# Patient Record
Sex: FEMALE | Race: ASIAN
[De-identification: names, ages, dates, MRNs, and addresses within clinical notes are randomized per-mention and may not be internally consistent; named-entity substitution may affect disease eponyms.]

---

## 2020-08-25 ENCOUNTER — HOSPITAL ENCOUNTER (INPATIENT)
Dept: HOSPITAL 4 - SED | Age: 75
LOS: 5 days | Discharge: HOME | DRG: 871 | End: 2020-08-30
Attending: INTERNAL MEDICINE | Admitting: INTERNAL MEDICINE
Payer: SELF-PAY

## 2020-08-25 VITALS — SYSTOLIC BLOOD PRESSURE: 117 MMHG

## 2020-08-25 VITALS — HEIGHT: 63 IN | BODY MASS INDEX: 22.34 KG/M2 | WEIGHT: 126.06 LBS

## 2020-08-25 VITALS — SYSTOLIC BLOOD PRESSURE: 131 MMHG

## 2020-08-25 VITALS — SYSTOLIC BLOOD PRESSURE: 134 MMHG

## 2020-08-25 VITALS — SYSTOLIC BLOOD PRESSURE: 104 MMHG

## 2020-08-25 VITALS — SYSTOLIC BLOOD PRESSURE: 158 MMHG

## 2020-08-25 VITALS — SYSTOLIC BLOOD PRESSURE: 114 MMHG

## 2020-08-25 DIAGNOSIS — E83.41: ICD-10-CM

## 2020-08-25 DIAGNOSIS — Z79.01: ICD-10-CM

## 2020-08-25 DIAGNOSIS — E87.6: ICD-10-CM

## 2020-08-25 DIAGNOSIS — I50.41: ICD-10-CM

## 2020-08-25 DIAGNOSIS — I34.0: ICD-10-CM

## 2020-08-25 DIAGNOSIS — E87.1: ICD-10-CM

## 2020-08-25 DIAGNOSIS — E87.5: ICD-10-CM

## 2020-08-25 DIAGNOSIS — A41.9: Primary | ICD-10-CM

## 2020-08-25 DIAGNOSIS — E83.51: ICD-10-CM

## 2020-08-25 DIAGNOSIS — Z20.828: ICD-10-CM

## 2020-08-25 DIAGNOSIS — I11.0: ICD-10-CM

## 2020-08-25 DIAGNOSIS — J96.01: ICD-10-CM

## 2020-08-25 DIAGNOSIS — I35.1: ICD-10-CM

## 2020-08-25 DIAGNOSIS — J18.9: ICD-10-CM

## 2020-08-25 DIAGNOSIS — E87.2: ICD-10-CM

## 2020-08-25 LAB
ALBUMIN SERPL BCP-MCNC: 3.5 G/DL (ref 3.4–4.8)
ALT SERPL W P-5'-P-CCNC: 44 U/L (ref 12–78)
ANION GAP SERPL CALCULATED.3IONS-SCNC: 13 MMOL/L (ref 5–15)
APPEARANCE UR: CLEAR
AST SERPL W P-5'-P-CCNC: 27 U/L (ref 10–37)
BACTERIA URNS QL MICRO: (no result) /HPF
BASOPHILS # BLD AUTO: 0 K/UL (ref 0–0.2)
BASOPHILS NFR BLD AUTO: 0.3 % (ref 0–2)
BILIRUB SERPL-MCNC: 0.9 MG/DL (ref 0–1)
BILIRUB UR QL STRIP: NEGATIVE
BUN SERPL-MCNC: 13 MG/DL (ref 8–21)
CALCIUM SERPL-MCNC: 8.7 MG/DL (ref 8.4–11)
CHLORIDE SERPL-SCNC: 93 MMOL/L (ref 98–107)
COLOR UR: YELLOW
CREAT SERPL-MCNC: 1.39 MG/DL (ref 0.55–1.3)
CRP SERPL-MCNC: 0.3 MG/DL (ref 0–0.5)
EOSINOPHIL # BLD AUTO: 0.1 K/UL (ref 0–0.4)
EOSINOPHIL NFR BLD AUTO: 0.7 % (ref 0–4)
ERYTHROCYTE [DISTWIDTH] IN BLOOD BY AUTOMATED COUNT: 12.9 % (ref 9–15)
FIBRINOGEN PPP-MCNC: 333 MG/DL (ref 200–400)
GFR SERPL CREATININE-BSD FRML MDRD: (no result) ML/MIN (ref 90–?)
GLUCOSE SERPL-MCNC: 298 MG/DL (ref 70–99)
GLUCOSE UR STRIP-MCNC: (no result) MG/DL
HCT VFR BLD AUTO: 43.3 % (ref 36–48)
HGB BLD-MCNC: 14.5 G/DL (ref 12–16)
HGB UR QL STRIP: NEGATIVE
INR PPP: 1 (ref 0.8–1.2)
KETONES UR STRIP-MCNC: NEGATIVE MG/DL
LACTATE SERPL-SCNC: 246 U/L (ref 81–234)
LEUKOCYTE ESTERASE UR QL STRIP: NEGATIVE
LYMPHOCYTES # BLD AUTO: 3.9 K/UL (ref 1–5.5)
LYMPHOCYTES NFR BLD AUTO: 37.3 % (ref 20.5–51.5)
MCH RBC QN AUTO: 31 PG (ref 27–31)
MCHC RBC AUTO-ENTMCNC: 34 % (ref 32–36)
MCV RBC AUTO: 92 FL (ref 79–98)
MONOCYTES # BLD MANUAL: 0.6 K/UL (ref 0–1)
MONOCYTES # BLD MANUAL: 5.5 % (ref 1.7–9.3)
NEUTROPHILS # BLD AUTO: 5.8 K/UL (ref 1.8–7.7)
NEUTROPHILS NFR BLD AUTO: 56.2 % (ref 40–70)
NITRITE UR QL STRIP: NEGATIVE
PH UR STRIP: 6 [PH] (ref 5–8)
PLATELET # BLD AUTO: 268 K/UL (ref 130–430)
POTASSIUM SERPL-SCNC: 3.4 MMOL/L (ref 3.5–5.1)
PROT UR QL STRIP: (no result)
PROTHROMBIN TIME: 10.4 SECS (ref 9.5–12.5)
RBC # BLD AUTO: 4.69 MIL/UL (ref 4.2–6.2)
RBC #/AREA URNS HPF: (no result) /HPF (ref 0–3)
SODIUM SERPLBLD-SCNC: 126 MMOL/L (ref 136–145)
SP GR UR STRIP: 1.02 (ref 1–1.03)
UROBILINOGEN UR STRIP-MCNC: 0.2 MG/DL (ref 0.2–1)
WBC # BLD AUTO: 10.4 K/UL (ref 4.8–10.8)
WBC #/AREA URNS HPF: (no result) /HPF (ref 0–3)

## 2020-08-25 PROCEDURE — G0378 HOSPITAL OBSERVATION PER HR: HCPCS

## 2020-08-25 RX ADMIN — IPRATROPIUM BROMIDE AND ALBUTEROL SULFATE SCH ML: .5; 3 SOLUTION RESPIRATORY (INHALATION) at 20:10

## 2020-08-25 RX ADMIN — IPRATROPIUM BROMIDE AND ALBUTEROL SULFATE SCH ML: .5; 3 SOLUTION RESPIRATORY (INHALATION) at 13:31

## 2020-08-25 RX ADMIN — CARVEDILOL SCH MG: 6.25 TABLET, FILM COATED ORAL at 20:15

## 2020-08-25 RX ADMIN — FUROSEMIDE SCH MG: 10 INJECTION, SOLUTION INTRAMUSCULAR; INTRAVENOUS at 08:15

## 2020-08-25 RX ADMIN — IPRATROPIUM BROMIDE AND ALBUTEROL SULFATE SCH ML: .5; 3 SOLUTION RESPIRATORY (INHALATION) at 15:10

## 2020-08-25 RX ADMIN — ENOXAPARIN SODIUM SCH MG: 30 INJECTION SUBCUTANEOUS at 13:00

## 2020-08-25 RX ADMIN — IPRATROPIUM BROMIDE AND ALBUTEROL SULFATE SCH ML: .5; 3 SOLUTION RESPIRATORY (INHALATION) at 23:15

## 2020-08-25 RX ADMIN — FUROSEMIDE SCH MG: 10 INJECTION, SOLUTION INTRAMUSCULAR; INTRAVENOUS at 12:00

## 2020-08-25 RX ADMIN — IPRATROPIUM BROMIDE AND ALBUTEROL SULFATE SCH ML: .5; 3 SOLUTION RESPIRATORY (INHALATION) at 08:15

## 2020-08-25 NOTE — NUR
1500mls of Normal Saline given as bolus per MD order. 1L infusing at 100mls/hr 
per md order. sepsis protocol initiated per MD order.

## 2020-08-25 NOTE — NUR
ADMISSION NOTE

Received patient from ER via elissa, received report from GREGOR mcguire. Patient admitted with 
diagnosis of PNA, PUI. Patient oriented to hospital routine, call light, toileting and 
safety-patient verbalized understanding.

## 2020-08-25 NOTE — NUR
initial notes

rec patient awake alert and speaks Polish only. with silvano mask at 9 liters . no sob 
noted and sat is 100%. denies pain.call light withn reached. seen by dr segundo. awaiting for 
dr lee to call for diet order.

## 2020-08-25 NOTE — NUR
closing notes

denies pain, resting comfortably needs met. no sob noted. call light within reached. bed in 
low positon and side rails up and locked.

## 2020-08-25 NOTE — NUR
pt BIB ALS from home c/o of shortness of breath that started today. pt O2 was 
91% when FIRE arrived, was put on a nonrebreather on 15L. pt arrived and o2 
saturation 100% on 15L o2. pt appears anxious, diaphoretic, cool to the touch, 
and is working to breathe. pt speaks Turkish.

## 2020-08-25 NOTE — NUR
Patient will be admitted to care of DR. WATKINS.  Admitted to TELE unit.  Will 
go to room 120.  Belongings list completed.  Complete and up to date summary 
report printed. SBAR report to be given at bedside with opportunity for 
questions.

## 2020-08-25 NOTE — NUR
Placed in room 8  . Placed on cardiac monitor, blood pressure machine and pulse 
oximeter. To gown for exam. Side rails up.

Report given to Delma BUNDY.

## 2020-08-25 NOTE — NUR
Pt is resting quietly in bed. No c/o pain or discomfort. Fall, Droplet Isolation and Safety 
precautions are in place.

## 2020-08-25 NOTE — NUR
rounds

son came and brought cell phone for patient. son constantly calls and wants an update for 
the patient. resting comfortably.

## 2020-08-25 NOTE — NUR
# 20 gauge angiocath placed to Lt hand. Use of asceptic technique.  Opsite 
placed over site.  Blood return noted.  Blood for lab drawn from site.  Flushed 
with 10 cc of normal saline.  No evidence of infiltration noted.  Patient 
tolerated well.

## 2020-08-25 NOTE — NUR
RN returned telephone call to pt's son Giuliano. Updates were given to him per his request 
and all his questions were answered.

-------------------------------------------------------------------------------

Addendum: 08/25/20 at 2129 by Basia Mcdowell RN

-------------------------------------------------------------------------------

Please disregard for wrong entry time.

## 2020-08-25 NOTE — NUR
Pt was received lying in bed fully awake, alert and oriented x4. Pt is on oxygen at 2L/min 
via NC and oxygen saturation is 98%. Pt is Jordanian speaking, but able to understand and 
speak little English. Pt is able to make her needs known in English. Saline lock in right 
hand is without any signs of infiltration. Fall, Droplet Isolation and Safety precautions 
are in place. Call light is with pt and bed alarm is on.

## 2020-08-25 NOTE — NUR
Scheduled HS medication given and no difficulty swallowing noted. Pt remains on oxygen at 
2L/min per NC. Call light is with pt and bed alarm is on.

## 2020-08-25 NOTE — NUR
CLOSING NOTE

PATIENT AWAKE IN BED, ASKING TO GO HOME. EDUCATED PATIENT ON REASON SHES IN THE HOSPITAL. 
RESPIRATIONS EVEN AND UNLABORED ON OXYGEN 10LPM VIA VENTURI MASK. TOLERATING IVF AS ORDERED, 
IV INTACT AND PATENT WITH NO SIGNS OF INFILTRATION NOTED. ISOLATION PRECAUTIONS REMAIN IN 
PLACE PER COVID PUI. BED LOCKED IN LOWEST POSITION, WITH CALL LIGHT IN REACH. WILL CONTINUE 
TO MONITOR UNTIL ENDORSED TO AM NURSE. PATIENT STABLE. ALL PATIENT CARE NEEDS MET THROUGHOUT 
SHIFT.

## 2020-08-25 NOTE — NUR
RN returned telephone call to pt's son Giuliano. Updates were given to him per his request 
and all his questions were answered.

## 2020-08-26 VITALS — SYSTOLIC BLOOD PRESSURE: 112 MMHG

## 2020-08-26 VITALS — SYSTOLIC BLOOD PRESSURE: 122 MMHG

## 2020-08-26 VITALS — SYSTOLIC BLOOD PRESSURE: 130 MMHG

## 2020-08-26 VITALS — SYSTOLIC BLOOD PRESSURE: 143 MMHG

## 2020-08-26 VITALS — SYSTOLIC BLOOD PRESSURE: 138 MMHG

## 2020-08-26 LAB
ALBUMIN SERPL BCP-MCNC: 3.4 G/DL (ref 3.4–4.8)
ALT SERPL W P-5'-P-CCNC: 49 U/L (ref 12–78)
ANION GAP SERPL CALCULATED.3IONS-SCNC: 12 MMOL/L (ref 5–15)
AST SERPL W P-5'-P-CCNC: 37 U/L (ref 10–37)
BASOPHILS # BLD AUTO: 0 K/UL (ref 0–0.2)
BASOPHILS NFR BLD AUTO: 0.2 % (ref 0–2)
BILIRUB SERPL-MCNC: 0.9 MG/DL (ref 0–1)
BUN SERPL-MCNC: 14 MG/DL (ref 8–21)
CALCIUM SERPL-MCNC: 8.2 MG/DL (ref 8.4–11)
CHLORIDE SERPL-SCNC: 91 MMOL/L (ref 98–107)
CHOLEST SERPL-MCNC: 223 MG/DL (ref ?–200)
CREAT SERPL-MCNC: 1.16 MG/DL (ref 0.55–1.3)
EOSINOPHIL # BLD AUTO: 0 K/UL (ref 0–0.4)
EOSINOPHIL NFR BLD AUTO: 0 % (ref 0–4)
ERYTHROCYTE [DISTWIDTH] IN BLOOD BY AUTOMATED COUNT: 13 % (ref 9–15)
GFR SERPL CREATININE-BSD FRML MDRD: (no result) ML/MIN (ref 90–?)
GLUCOSE SERPL-MCNC: 139 MG/DL (ref 70–99)
HCT VFR BLD AUTO: 39.6 % (ref 36–48)
HDLC SERPL-MCNC: 65 MG/DL (ref 55–?)
HGB BLD-MCNC: 13.4 G/DL (ref 12–16)
LDL CHOLESTEROL: 138 MG/DL (ref ?–100)
LYMPHOCYTES # BLD AUTO: 1.6 K/UL (ref 1–5.5)
LYMPHOCYTES NFR BLD AUTO: 17.9 % (ref 20.5–51.5)
MCH RBC QN AUTO: 31 PG (ref 27–31)
MCHC RBC AUTO-ENTMCNC: 34 % (ref 32–36)
MCV RBC AUTO: 92 FL (ref 79–98)
MONOCYTES # BLD MANUAL: 0.5 K/UL (ref 0–1)
MONOCYTES # BLD MANUAL: 5 % (ref 1.7–9.3)
NEUTROPHILS # BLD AUTO: 7 K/UL (ref 1.8–7.7)
NEUTROPHILS NFR BLD AUTO: 76.9 % (ref 40–70)
PLATELET # BLD AUTO: 248 K/UL (ref 130–430)
POTASSIUM SERPL-SCNC: 3.5 MMOL/L (ref 3.5–5.1)
RBC # BLD AUTO: 4.31 MIL/UL (ref 4.2–6.2)
SODIUM SERPLBLD-SCNC: 125 MMOL/L (ref 136–145)
TRIGL SERPL-MCNC: 107 MG/DL (ref 30–150)
TSH SERPL DL<=0.05 MIU/L-ACNC: 1.3 UIU/ML (ref 0.36–3.74)
WBC # BLD AUTO: 9.1 K/UL (ref 4.8–10.8)

## 2020-08-26 RX ADMIN — IPRATROPIUM BROMIDE AND ALBUTEROL SULFATE SCH ML: .5; 3 SOLUTION RESPIRATORY (INHALATION) at 03:00

## 2020-08-26 RX ADMIN — IPRATROPIUM BROMIDE AND ALBUTEROL SULFATE SCH ML: .5; 3 SOLUTION RESPIRATORY (INHALATION) at 15:36

## 2020-08-26 RX ADMIN — IPRATROPIUM BROMIDE AND ALBUTEROL SULFATE SCH ML: .5; 3 SOLUTION RESPIRATORY (INHALATION) at 20:09

## 2020-08-26 RX ADMIN — IPRATROPIUM BROMIDE AND ALBUTEROL SULFATE SCH ML: .5; 3 SOLUTION RESPIRATORY (INHALATION) at 08:25

## 2020-08-26 RX ADMIN — FUROSEMIDE SCH MG: 10 INJECTION, SOLUTION INTRAMUSCULAR; INTRAVENOUS at 17:00

## 2020-08-26 RX ADMIN — CEFTRIAXONE SODIUM SCH MLS/HR: 1 INJECTION, SOLUTION INTRAVENOUS at 09:33

## 2020-08-26 RX ADMIN — IPRATROPIUM BROMIDE AND ALBUTEROL SULFATE SCH ML: .5; 3 SOLUTION RESPIRATORY (INHALATION) at 11:28

## 2020-08-26 RX ADMIN — Medication SCH MG: at 09:33

## 2020-08-26 RX ADMIN — FUROSEMIDE SCH MG: 10 INJECTION, SOLUTION INTRAMUSCULAR; INTRAVENOUS at 11:30

## 2020-08-26 RX ADMIN — ENOXAPARIN SODIUM SCH MG: 30 INJECTION SUBCUTANEOUS at 09:36

## 2020-08-26 RX ADMIN — SODIUM CHLORIDE SCH MLS/HR: 0.9 INJECTION, SOLUTION INTRAVENOUS at 09:33

## 2020-08-26 RX ADMIN — IPRATROPIUM BROMIDE AND ALBUTEROL SULFATE SCH ML: .5; 3 SOLUTION RESPIRATORY (INHALATION) at 23:28

## 2020-08-26 RX ADMIN — CARVEDILOL SCH MG: 6.25 TABLET, FILM COATED ORAL at 21:00

## 2020-08-26 RX ADMIN — CARVEDILOL SCH MG: 6.25 TABLET, FILM COATED ORAL at 09:34

## 2020-08-26 RX ADMIN — FUROSEMIDE SCH MG: 10 INJECTION, SOLUTION INTRAMUSCULAR; INTRAVENOUS at 09:35

## 2020-08-26 NOTE — NUR
MED ADMIN:

MORNING MEDS GIVEN, AS PER ORDERED BY M.D. TOLERATED WELL, WILL CONT' WITH PLAN OF CARE.

## 2020-08-26 NOTE — NUR
TRANSFER:

PT TRANSFERRED TO [facility] ROOM 113-B, DUE TO COVID (-) X2, ISOLATION DISCONTINUED, WILL RESUME 
SAME ORDERS, WILL CONT' POC.

## 2020-08-26 NOTE — NUR
Initial notes:



Received report from lashawn RN. Patient is in bed, resting. No acute distress. Even, 
nonlabored breathing on Venturi mask 6L. IV site is patent and intact, saline locked. Bed is 
locked at lowest position. Side rails up x2. Call light is with patient. Safety and fall 
precautions in place. Will continue plan of care.

## 2020-08-26 NOTE — NUR
NURSES NOTES:

PT A/A/OX3, CONDITION IS STABLE, NO DISTRESS NOTED AT THIS TIME, NO C/O PAIN OR DISCOMFORT, 
WILL CONT' TO MONITOR AND ASSESS.

## 2020-08-26 NOTE — NUR
NURSES NOTES:

PT REMAINS STABLE WITH 6L O2 VIA VENT MASK, IS RESTING WHILE IN AND OUT OF SLEEP, CALL LIGHT 
PLACED WITHIN REACH, WILL CONT' TO MONITOR AND ASSESS.

## 2020-08-26 NOTE — NUR
Pt c/o not receiving any air via NC. Pt requested oxygen via mask. O2 sat checked on oxygen 
at 2L/min via NC and it was 100%. Pt still not convinced that she is getting oxygen. Pt 
insisted on putting her back on oxygen via face mask. Pt appears anxious and 
hyperventilating. RT was notified.

## 2020-08-26 NOTE — NUR
Pt remains very anxious and continues to scream for oxygen via face mask. New orders 
received from Dr. Reinoso for Stat ABG and IV Ativan. RT was notified for Stat ABG.

## 2020-08-26 NOTE — NUR
Pt is sleeping without any respiratory distress noted. Fall, Droplet Isolation and safety 
precautions are in place.

## 2020-08-26 NOTE — NUR
ASSUMPTION OF CARE:

RECEIVED PT A/ANXIOUS/CONFUSED, DX:INADEQUATE VENTILATION, R/T PNU, PUI, AFEBRILE, VSS, PT 
HAS AUDIBLE WHEEZING IN BILATERALLY LUNGS, CURRENTLY ON 6L VIA NASAL CANNULA WITH BREATHING 
TX Q4 AND Q6 PRN, PT REORIENTED TO UNIT, CALL PLACED WITHIN REACH, WILL CONT' TO MONITOR AND 
ASSESS.

## 2020-08-26 NOTE — NUR
Pt is very anxious and keeps requesting oxygen via mask. Pt is currently on oxygen via NC at 
2L/min and oxygen saturation is 99%. Pt was informed her oxygen saturation is normal, but pt 
keeps on insisting on putting her back on mask. RN left a message for pt's son to call her 
back.

## 2020-08-26 NOTE — NUR
HIGH ALERT NOTE:

RN called Dr. Reinoso back at  [phone], identified within the medical roster to verify 
physician authenticity for Ativan order.

## 2020-08-26 NOTE — NUR
RT NOTES

Placed pt on 28% 6L venturi mask for comfort, due to despite good saturation on 2L NC pt 
complains of "not enough" flow. GREGOR Flor made aware.

## 2020-08-26 NOTE — NUR
RT at pt's bedside and pt's oxygen saturation 99% on oxygen at 2L/min per NC. Breathing 
treatment given to pt by RT. Pt still asking for oxygen via mask. Pt was informed she does 
not need oxygen via mask.

## 2020-08-26 NOTE — NUR
Spoke to family:



Spoke to sonGiuliano over facetime with patient at bedside. Updated him on patient status 
and patient plan of care.

## 2020-08-27 VITALS — SYSTOLIC BLOOD PRESSURE: 112 MMHG

## 2020-08-27 VITALS — SYSTOLIC BLOOD PRESSURE: 108 MMHG

## 2020-08-27 VITALS — SYSTOLIC BLOOD PRESSURE: 106 MMHG

## 2020-08-27 VITALS — SYSTOLIC BLOOD PRESSURE: 110 MMHG

## 2020-08-27 VITALS — SYSTOLIC BLOOD PRESSURE: 107 MMHG

## 2020-08-27 LAB
ALBUMIN SERPL BCP-MCNC: 3 G/DL (ref 3.4–4.8)
ALT SERPL W P-5'-P-CCNC: 54 U/L (ref 12–78)
ANION GAP SERPL CALCULATED.3IONS-SCNC: 10 MMOL/L (ref 5–15)
AST SERPL W P-5'-P-CCNC: 45 U/L (ref 10–37)
BASOPHILS # BLD AUTO: 0 K/UL (ref 0–0.2)
BASOPHILS NFR BLD AUTO: 0.2 % (ref 0–2)
BILIRUB SERPL-MCNC: 0.7 MG/DL (ref 0–1)
BUN SERPL-MCNC: 21 MG/DL (ref 8–21)
CALCIUM SERPL-MCNC: 8.2 MG/DL (ref 8.4–11)
CHLORIDE SERPL-SCNC: 94 MMOL/L (ref 98–107)
CREAT SERPL-MCNC: 1.2 MG/DL (ref 0.55–1.3)
EOSINOPHIL # BLD AUTO: 0.1 K/UL (ref 0–0.4)
EOSINOPHIL NFR BLD AUTO: 0.6 % (ref 0–4)
ERYTHROCYTE [DISTWIDTH] IN BLOOD BY AUTOMATED COUNT: 13.1 % (ref 9–15)
GFR SERPL CREATININE-BSD FRML MDRD: (no result) ML/MIN (ref 90–?)
GLUCOSE SERPL-MCNC: 118 MG/DL (ref 70–99)
HCT VFR BLD AUTO: 36.3 % (ref 36–48)
HGB BLD-MCNC: 12.6 G/DL (ref 12–16)
LYMPHOCYTES # BLD AUTO: 1.3 K/UL (ref 1–5.5)
LYMPHOCYTES NFR BLD AUTO: 14.6 % (ref 20.5–51.5)
MCH RBC QN AUTO: 31 PG (ref 27–31)
MCHC RBC AUTO-ENTMCNC: 35 % (ref 32–36)
MCV RBC AUTO: 90 FL (ref 79–98)
MONOCYTES # BLD MANUAL: 0.9 K/UL (ref 0–1)
MONOCYTES # BLD MANUAL: 9.7 % (ref 1.7–9.3)
NEUTROPHILS # BLD AUTO: 6.8 K/UL (ref 1.8–7.7)
NEUTROPHILS NFR BLD AUTO: 74.9 % (ref 40–70)
PLATELET # BLD AUTO: 243 K/UL (ref 130–430)
POTASSIUM SERPL-SCNC: 3.6 MMOL/L (ref 3.5–5.1)
RBC # BLD AUTO: 4.04 MIL/UL (ref 4.2–6.2)
SODIUM SERPLBLD-SCNC: 126 MMOL/L (ref 136–145)
WBC # BLD AUTO: 9.1 K/UL (ref 4.8–10.8)

## 2020-08-27 RX ADMIN — IPRATROPIUM BROMIDE AND ALBUTEROL SULFATE SCH ML: .5; 3 SOLUTION RESPIRATORY (INHALATION) at 22:50

## 2020-08-27 RX ADMIN — IPRATROPIUM BROMIDE AND ALBUTEROL SULFATE SCH ML: .5; 3 SOLUTION RESPIRATORY (INHALATION) at 19:00

## 2020-08-27 RX ADMIN — CARVEDILOL SCH MG: 6.25 TABLET, FILM COATED ORAL at 08:57

## 2020-08-27 RX ADMIN — CEFTRIAXONE SODIUM SCH MLS/HR: 1 INJECTION, SOLUTION INTRAVENOUS at 09:04

## 2020-08-27 RX ADMIN — FUROSEMIDE SCH MG: 10 INJECTION, SOLUTION INTRAMUSCULAR; INTRAVENOUS at 06:12

## 2020-08-27 RX ADMIN — IPRATROPIUM BROMIDE AND ALBUTEROL SULFATE SCH ML: .5; 3 SOLUTION RESPIRATORY (INHALATION) at 15:21

## 2020-08-27 RX ADMIN — FUROSEMIDE SCH MG: 40 TABLET ORAL at 21:25

## 2020-08-27 RX ADMIN — CARVEDILOL SCH MG: 6.25 TABLET, FILM COATED ORAL at 21:25

## 2020-08-27 RX ADMIN — ENOXAPARIN SODIUM SCH MG: 30 INJECTION SUBCUTANEOUS at 08:59

## 2020-08-27 RX ADMIN — SODIUM CHLORIDE SCH MLS/HR: 0.9 INJECTION, SOLUTION INTRAVENOUS at 09:04

## 2020-08-27 RX ADMIN — IPRATROPIUM BROMIDE AND ALBUTEROL SULFATE SCH ML: .5; 3 SOLUTION RESPIRATORY (INHALATION) at 07:15

## 2020-08-27 RX ADMIN — IPRATROPIUM BROMIDE AND ALBUTEROL SULFATE SCH ML: .5; 3 SOLUTION RESPIRATORY (INHALATION) at 03:38

## 2020-08-27 RX ADMIN — IPRATROPIUM BROMIDE AND ALBUTEROL SULFATE SCH ML: .5; 3 SOLUTION RESPIRATORY (INHALATION) at 11:35

## 2020-08-27 RX ADMIN — Medication SCH MG: at 08:57

## 2020-08-27 NOTE — NUR
Nutrition Update



Zak Scale 18 noted.

Pt admitted for PNA, PUI

Diet: cardiac

BMI: 22.1 kg/m2



RD to follow per nutrition care standards.

## 2020-08-27 NOTE — NUR
Rounds:



Patient is in bed, sleeping. No acute distress. Breathing is even, nonlabored on Venturi 
mask 6L. Call light is with patient. Safety and fall precautions in place. Will continue to 
monitor.

## 2020-08-27 NOTE — NUR
Rounds:



Patient is in bed, sleeping. No acute distress. Respirations are even, nonlabored on Venturi 
mask 6L. Call light is with patient. Safety and fall precautions in place. Will continue 
monitoring.

## 2020-08-27 NOTE — NUR
Opening Notes

Patient received in bed able to answer questions and follow commands. Patient in no signs of 
distress at this time. Patient on a venturi mask at 6L, with saturation at 97%. Patient has 
a peripheral IV. Patient does not complain of pain. Safety precautions enforced.

## 2020-08-27 NOTE — NUR
Closing note:



Patient is in bed, resting. No acute distress. Even, nonlabored breathing on 6L Venturi 
Mask. IV site is saline locked, patent and intact. All needs met. Bed is locked at lowest 
position. Side rails up x2. Call light is with patient. Safety and fall precautions in 
place. Will endorse care to dayshift RN.

## 2020-08-27 NOTE — NUR
RN Rounds

Patient resting at this time, no signs of distress noted. Patient has no complaints of pain.

## 2020-08-27 NOTE — NUR
Rounds:



Patient is in bed, eating. No s/s acute distress. Even, nonlabored respirations on Venturi 
mask 6L. Call light is with patient. Safety and fall precautions in place. Will continue to 
monitor.

## 2020-08-27 NOTE — NUR
Spoke to Dr. Srinivasan:



Spoke to Dr. Srinivasan over the phone regarding patients /71 and HR 91. MD stated to 
continue scheduled medications Coreg 12.5mg PO and Lasix 40mg PO. Will continue to monitor 
and reassess patient.

## 2020-08-27 NOTE — NUR
Spoke to family member:



Spoke to son, Giuliano, twice. Son notified me of patient's concern of a cough. Educated son 
about medications given. Son translated information to patient. Patient verbalized 
understanding.

## 2020-08-27 NOTE — NUR
Initial notes:



Received report from dayshift RN. Patient is in bed, resting. No acute distress. Even, 
nonlabored breathing on 2L nasal cannula. IV site is patent and intact, saline locked. Bed 
is locked at lowest position. Side rails up x2. Call light is with patient. Safety and fall 
precautions in place. Will continue plan of care.

## 2020-08-28 VITALS — SYSTOLIC BLOOD PRESSURE: 99 MMHG

## 2020-08-28 VITALS — SYSTOLIC BLOOD PRESSURE: 100 MMHG

## 2020-08-28 VITALS — SYSTOLIC BLOOD PRESSURE: 97 MMHG

## 2020-08-28 VITALS — SYSTOLIC BLOOD PRESSURE: 105 MMHG

## 2020-08-28 VITALS — SYSTOLIC BLOOD PRESSURE: 122 MMHG

## 2020-08-28 LAB
ANION GAP SERPL CALCULATED.3IONS-SCNC: 8 MMOL/L (ref 5–15)
BUN SERPL-MCNC: 20 MG/DL (ref 8–21)
CALCIUM SERPL-MCNC: 8.3 MG/DL (ref 8.4–11)
CHLORIDE SERPL-SCNC: 100 MMOL/L (ref 98–107)
CREAT SERPL-MCNC: 1.25 MG/DL (ref 0.55–1.3)
GFR SERPL CREATININE-BSD FRML MDRD: (no result) ML/MIN (ref 90–?)
GLUCOSE SERPL-MCNC: 97 MG/DL (ref 70–99)
POTASSIUM SERPL-SCNC: 3.1 MMOL/L (ref 3.5–5.1)
SODIUM SERPLBLD-SCNC: 137 MMOL/L (ref 136–145)

## 2020-08-28 RX ADMIN — CEFTRIAXONE SODIUM SCH MLS/HR: 1 INJECTION, SOLUTION INTRAVENOUS at 08:14

## 2020-08-28 RX ADMIN — FUROSEMIDE SCH MG: 40 TABLET ORAL at 20:26

## 2020-08-28 RX ADMIN — ENOXAPARIN SODIUM SCH MG: 30 INJECTION SUBCUTANEOUS at 08:15

## 2020-08-28 RX ADMIN — Medication SCH MG: at 08:16

## 2020-08-28 RX ADMIN — IPRATROPIUM BROMIDE AND ALBUTEROL SULFATE SCH ML: .5; 3 SOLUTION RESPIRATORY (INHALATION) at 03:22

## 2020-08-28 RX ADMIN — CARVEDILOL SCH MG: 6.25 TABLET, FILM COATED ORAL at 09:00

## 2020-08-28 RX ADMIN — LOSARTAN POTASSIUM SCH MG: 50 TABLET, FILM COATED ORAL at 09:00

## 2020-08-28 RX ADMIN — FUROSEMIDE SCH MG: 40 TABLET ORAL at 08:15

## 2020-08-28 RX ADMIN — IPRATROPIUM BROMIDE AND ALBUTEROL SULFATE SCH ML: .5; 3 SOLUTION RESPIRATORY (INHALATION) at 15:53

## 2020-08-28 RX ADMIN — IPRATROPIUM BROMIDE AND ALBUTEROL SULFATE SCH ML: .5; 3 SOLUTION RESPIRATORY (INHALATION) at 12:23

## 2020-08-28 RX ADMIN — CARVEDILOL SCH MG: 6.25 TABLET, FILM COATED ORAL at 20:27

## 2020-08-28 RX ADMIN — IPRATROPIUM BROMIDE AND ALBUTEROL SULFATE SCH ML: .5; 3 SOLUTION RESPIRATORY (INHALATION) at 20:04

## 2020-08-28 RX ADMIN — SODIUM CHLORIDE SCH MLS/HR: 0.9 INJECTION, SOLUTION INTRAVENOUS at 09:47

## 2020-08-28 RX ADMIN — IPRATROPIUM BROMIDE AND ALBUTEROL SULFATE SCH ML: .5; 3 SOLUTION RESPIRATORY (INHALATION) at 23:40

## 2020-08-28 RX ADMIN — IPRATROPIUM BROMIDE AND ALBUTEROL SULFATE SCH ML: .5; 3 SOLUTION RESPIRATORY (INHALATION) at 08:11

## 2020-08-28 NOTE — NUR
Dr. Reinoso rounds

informed of low BP, MD assessed patient at bedside, spoke with patient's son over the phone 
to updated on plan of care. Will follow up with any new orders.

## 2020-08-28 NOTE — NUR
Closing note

patient resting in bed no signs of distress, all needs met, will endorse report to incoming 
night shift nurse, bed in lowest position, two side rails up, fall/aspiration precautions in 
place.

## 2020-08-28 NOTE — NUR
NOTE



SCHEDULED MEDICATIONS GIVEN AT THIS TIME. PATIENT TOLERATED WELL. CALL LIGHT PLACED WITHIN 
REACH. BED IS LOCKED, ALARMED, AND AT THE LOWEST LEVEL.

## 2020-08-28 NOTE — NUR
RN Rounds

patient resting in bed, a/o x 4, no signs of distress, IV line flushed and patent, vital 
signs stable with exception of BP, will continue to monitor BP, All needs attended to, 
safety measures maintained, call light within reach, bed at lowest position, fall and 
aspiration precautions put in place.

## 2020-08-28 NOTE — NUR
RN rounds

patient resting in bed, wanting to ambulate in the hallway, assisted patient to ambulate, 
she tolerated well, denies shortness of breath, returned patient to her bed again, 
continuing to monitor, bed in lowest position, two side rails up, call light within reach, 
fall and aspiration precautions in place.

## 2020-08-28 NOTE — NUR
Rounds:



Patient is in bed, sleeping. No signs of acute distress. Respirations are even, nonlabored 
on 2L nasal cannula. Call light is with patient. Safety and fall precautions in place. Will 
continue to monitor.

## 2020-08-28 NOTE — NUR
Dr. Srinivasan rounds

informed of low BP, MD assessed patient at bedside, Dr. Srinivasan asked to check patient's O2 
saturation on room air, saturation was % on room, discontinued nasal cannula at this 
time, continuing to monitor patient. Will follow up with any new orders.

## 2020-08-28 NOTE — NUR
NOTE



PATIENT IS RESTING IN BED, STABLE, NO SIGNS OF RESPIRATORY DISTRESS. CALL LIGHT PLACED 
WITHIN REACH. BED IS LOCKED, ALARMED, AND AT THE LOWEST LEVEL. 

-------------------------------------------------------------------------------

Addendum: 08/29/20 at 0719 by Elliott Roland RN

-------------------------------------------------------------------------------

NOTE INTENDED FOR DIFFERENT TIME

## 2020-08-28 NOTE — NUR
RN Rounds

patient is awake and sitting up talking on the phone, patient is tolerating oxygen on room 
air with no signs of distress or shortness of breath, patient in stable condition, safety 
precautions in place, will continue to monitor patient for any changes.

## 2020-08-28 NOTE — NUR
Opening note

Patient is laying down resting, a/o x4, IV saline lock in place patent and infusing well, 
assessment complete, Safety measures in place, fall risk and aspiration precautions in 
place, call light within patient reach, bed in lowest position.

## 2020-08-28 NOTE — NUR
NOTE



PATIENT IS RESTING IN A CHAIR AT BEDSIDE, STABLE, NO SIGNS OF RESPIRATORY DISTRESS. CALL 
LIGHT PLACED WITHIN REACH. BED IS LOCKED, ALARMED, AND AT THE LOWEST LEVEL.

## 2020-08-28 NOTE — NUR
Closing note:



Patient is in bed, sleeping. No acute distress. Even, nonlabored breathing on 2L nasal 
cannula. V site is saline locked, patent and intact. All needs met. Bed is locked at lowest 
position. Side rails up x2. Call light is with patient. Safety and fall precautions in 
place. Will endorse care to dayshift RN.

## 2020-08-28 NOTE — NUR
Rounds:



Patient is in bed, sleeping. No acute distress. Breathing is even, nonlabored on 2L nasal 
cannula. Call light is with patient. Safety and fall precautions in place. Will continue to 
monitor.

## 2020-08-28 NOTE — NUR
Rounds:



Patient is sleeping in bed. No s/s of acute distress. Breathing is even, nonlabored on 2L 
nasal cannula. Call light is with patient. Safety and fall precautions in place. Will 
continue to monitor.

## 2020-08-29 VITALS — SYSTOLIC BLOOD PRESSURE: 118 MMHG

## 2020-08-29 VITALS — SYSTOLIC BLOOD PRESSURE: 99 MMHG

## 2020-08-29 VITALS — SYSTOLIC BLOOD PRESSURE: 110 MMHG

## 2020-08-29 VITALS — SYSTOLIC BLOOD PRESSURE: 94 MMHG

## 2020-08-29 VITALS — SYSTOLIC BLOOD PRESSURE: 123 MMHG

## 2020-08-29 RX ADMIN — FUROSEMIDE SCH MG: 40 TABLET ORAL at 08:24

## 2020-08-29 RX ADMIN — IPRATROPIUM BROMIDE AND ALBUTEROL SULFATE SCH ML: .5; 3 SOLUTION RESPIRATORY (INHALATION) at 23:57

## 2020-08-29 RX ADMIN — Medication SCH MG: at 08:24

## 2020-08-29 RX ADMIN — CARVEDILOL SCH MG: 6.25 TABLET, FILM COATED ORAL at 20:46

## 2020-08-29 RX ADMIN — IPRATROPIUM BROMIDE AND ALBUTEROL SULFATE SCH ML: .5; 3 SOLUTION RESPIRATORY (INHALATION) at 15:00

## 2020-08-29 RX ADMIN — IPRATROPIUM BROMIDE AND ALBUTEROL SULFATE SCH ML: .5; 3 SOLUTION RESPIRATORY (INHALATION) at 03:55

## 2020-08-29 RX ADMIN — IPRATROPIUM BROMIDE AND ALBUTEROL SULFATE SCH ML: .5; 3 SOLUTION RESPIRATORY (INHALATION) at 09:58

## 2020-08-29 RX ADMIN — FUROSEMIDE SCH MG: 40 TABLET ORAL at 20:46

## 2020-08-29 RX ADMIN — POTASSIUM CHLORIDE SCH MEQ: 1.5 POWDER, FOR SOLUTION ORAL at 08:24

## 2020-08-29 RX ADMIN — LOSARTAN POTASSIUM SCH MG: 50 TABLET, FILM COATED ORAL at 10:18

## 2020-08-29 RX ADMIN — ENOXAPARIN SODIUM SCH MG: 30 INJECTION SUBCUTANEOUS at 08:25

## 2020-08-29 RX ADMIN — IPRATROPIUM BROMIDE AND ALBUTEROL SULFATE SCH ML: .5; 3 SOLUTION RESPIRATORY (INHALATION) at 12:01

## 2020-08-29 RX ADMIN — CARVEDILOL SCH MG: 6.25 TABLET, FILM COATED ORAL at 08:24

## 2020-08-29 RX ADMIN — IPRATROPIUM BROMIDE AND ALBUTEROL SULFATE SCH ML: .5; 3 SOLUTION RESPIRATORY (INHALATION) at 19:58

## 2020-08-29 NOTE — NUR
Patient ambulating throughout unit, steady gait, connected to continuous cardiac monitor. 
Patient denies any pain or SOB at this time.

## 2020-08-29 NOTE — NUR
Assisted patient back to bed from using bathroom. Connected to continuous cardiac monitor. 
Patient denies any pain or SOB at this time. No signs or symptoms of acute distress noted.

## 2020-08-29 NOTE — NUR
Opening Note

Received bedside report from endorsing RN for continuation of care. Received patient awake 
and brushing her teeth at the sink, patient denies any SOB or pain at this time. No signs or 
symptoms of acute distress noted. Call light within reach. Safety precautions in place. All 
needs met.

## 2020-08-29 NOTE — NUR
OPENING NOTES

RECEIVED SBAR REPORT FROM DAY SHIFT RN. PT RESTING IN BED, ALERT & ORIENTED X4. BREATHING 
EVEN AND UNLABORED TO ROOM AIR. NO S/S OF DISTRESS NOTED. PT DENIES ANY PAIN AT THIS TIME. 
RIGHT HAND 22G INTACT, NO SIGNS OF INFILTRATION NOTED. BED LOCKED IN LOWEST POSITION. SIDE 
RAILS UP. CLOSE TO NURSING STATION. SAFETY AND FALL PRECAUTIONS ARE IN PLACE. WILL CONTINUE 
TO MONITOR.

## 2020-08-29 NOTE — NUR
CLOSING NOTE



PATIENT SLEPT WELL THROUGHOUT THE SHIFT. AT THIS TIME, PATIENT IS RESTING IN BED, STABLE, NO 
SIGNS OF RESPIRATORY DISTRESS. CALL LIGHT IS WITHIN REACH. BED IS LOCKED, ALARMED, AND AT 
THE LOWEST LEVEL. FALL, SAFETY, AND RESPIRATORY PRECAUTIONS HAVE BEEN IN PLACE THROUGHOUT 
THE SHIFT. WILL CONTINUE TO MONITOR UNTIL REPORT IS GIVEN AT BEDSIDE TO AM NURSE.

## 2020-08-29 NOTE — NUR
NOTE



PATIENT IS SLEEPING, STABLE, NO SIGNS OF RESPIRATORY DISTRESS. CALL LIGHT PLACED WITHIN 
REACH. BED IS LOCKED, ALARMED, AND AT THE LOWEST LEVEL.

## 2020-08-29 NOTE — NUR
NOTE



PATIENT IS RESTING IN BED, STABLE, NO SIGNS OF RESPIRATORY DISTRESS. CALL LIGHT PLACED 
WITHIN REACH. BED IS LOCKED, ALARMED, AND AT THE LOWEST LEVEL.

## 2020-08-29 NOTE — NUR
SCHEDULED MEDICATION HOLD

SCHEDULED MEDICATIONS HOLD DUE TO LOW BLOOD PRESSURE. BREATHING EVEN AND UNLABORED TO ROOM 
AIR. NO S/S OF DISTRESS NOTED. BED LOCKED IN LOWEST POSITION. SIDE RAILS UP X3. CALL LIGHT 
WITHIN REACH. SAFETY AND FALL PRECAUTIONS MAINTAINED. WILL CONTINUE TO MONITOR.

## 2020-08-29 NOTE — NUR
RN ROUNDS

PT SLEEPING, CHEST RISE AND FALL SYMMETRICAL. NO SIGNS AND SYMPTOMS OF SHORTNESS OF BREATH 
OR PAIN NOTED. BED LOCKED IN LOWEST POSITION. CALL LIGHT WITHIN REACH. CLOSE TO NURSING 
STATION. SAFETY AND FALL PRECAUTIONS ARE IN PLACE. WILL MONITOR.

## 2020-08-30 VITALS — SYSTOLIC BLOOD PRESSURE: 111 MMHG

## 2020-08-30 VITALS — SYSTOLIC BLOOD PRESSURE: 112 MMHG

## 2020-08-30 VITALS — SYSTOLIC BLOOD PRESSURE: 80 MMHG

## 2020-08-30 LAB
ANION GAP SERPL CALCULATED.3IONS-SCNC: 10 MMOL/L (ref 5–15)
BASOPHILS # BLD AUTO: 0 K/UL (ref 0–0.2)
BASOPHILS NFR BLD AUTO: 0.4 % (ref 0–2)
BUN SERPL-MCNC: 16 MG/DL (ref 8–21)
CALCIUM SERPL-MCNC: 8.7 MG/DL (ref 8.4–11)
CHLORIDE SERPL-SCNC: 99 MMOL/L (ref 98–107)
CREAT SERPL-MCNC: 1.11 MG/DL (ref 0.55–1.3)
EOSINOPHIL # BLD AUTO: 0.2 K/UL (ref 0–0.4)
EOSINOPHIL NFR BLD AUTO: 2.7 % (ref 0–4)
ERYTHROCYTE [DISTWIDTH] IN BLOOD BY AUTOMATED COUNT: 13.6 % (ref 9–15)
GFR SERPL CREATININE-BSD FRML MDRD: (no result) ML/MIN (ref 90–?)
GLUCOSE SERPL-MCNC: 96 MG/DL (ref 70–99)
HCT VFR BLD AUTO: 41 % (ref 36–48)
HGB BLD-MCNC: 13.7 G/DL (ref 12–16)
LYMPHOCYTES # BLD AUTO: 1.2 K/UL (ref 1–5.5)
LYMPHOCYTES NFR BLD AUTO: 20.4 % (ref 20.5–51.5)
MCH RBC QN AUTO: 31 PG (ref 27–31)
MCHC RBC AUTO-ENTMCNC: 33 % (ref 32–36)
MCV RBC AUTO: 92 FL (ref 79–98)
MONOCYTES # BLD MANUAL: 0.6 K/UL (ref 0–1)
MONOCYTES # BLD MANUAL: 10.4 % (ref 1.7–9.3)
NEUTROPHILS # BLD AUTO: 3.8 K/UL (ref 1.8–7.7)
NEUTROPHILS NFR BLD AUTO: 66.1 % (ref 40–70)
PLATELET # BLD AUTO: 231 K/UL (ref 130–430)
POTASSIUM SERPL-SCNC: 3.2 MMOL/L (ref 3.5–5.1)
RBC # BLD AUTO: 4.47 MIL/UL (ref 4.2–6.2)
SODIUM SERPLBLD-SCNC: 137 MMOL/L (ref 136–145)
WBC # BLD AUTO: 5.8 K/UL (ref 4.8–10.8)

## 2020-08-30 RX ADMIN — LOSARTAN POTASSIUM SCH MG: 50 TABLET, FILM COATED ORAL at 08:53

## 2020-08-30 RX ADMIN — Medication SCH MG: at 08:54

## 2020-08-30 RX ADMIN — IPRATROPIUM BROMIDE AND ALBUTEROL SULFATE SCH ML: .5; 3 SOLUTION RESPIRATORY (INHALATION) at 07:19

## 2020-08-30 RX ADMIN — IPRATROPIUM BROMIDE AND ALBUTEROL SULFATE SCH ML: .5; 3 SOLUTION RESPIRATORY (INHALATION) at 16:23

## 2020-08-30 RX ADMIN — CARVEDILOL SCH MG: 6.25 TABLET, FILM COATED ORAL at 08:53

## 2020-08-30 RX ADMIN — IPRATROPIUM BROMIDE AND ALBUTEROL SULFATE SCH ML: .5; 3 SOLUTION RESPIRATORY (INHALATION) at 03:27

## 2020-08-30 RX ADMIN — IPRATROPIUM BROMIDE AND ALBUTEROL SULFATE SCH ML: .5; 3 SOLUTION RESPIRATORY (INHALATION) at 11:32

## 2020-08-30 RX ADMIN — ENOXAPARIN SODIUM SCH MG: 30 INJECTION SUBCUTANEOUS at 08:52

## 2020-08-30 RX ADMIN — POTASSIUM CHLORIDE SCH MEQ: 1.5 POWDER, FOR SOLUTION ORAL at 08:52

## 2020-08-30 RX ADMIN — FUROSEMIDE SCH MG: 40 TABLET ORAL at 08:54

## 2020-08-30 NOTE — NUR
RN ROUNDS

PT RESTING IN BED. CHEST RISE AND FALL SYMMETRICAL. BREATHING EVEN AND UNLABORED. NO S/S OF 
DISTRESS NOTED. PT DENIES ANY PAIN AT THIS TIME. CALL LIGHT WITHIN REACH. SIDE RAILS UP. BED 
LOCKED IN LOWEST POSITION. SAFETY AND FALL PRECAUTIONS ARE IN PLACE. WILL MONITOR.

## 2020-08-30 NOTE — NUR
CLOSING NOTES

PT RESTING IN BED. BREATHING EVEN AND UNLABORED TO ROOM AIR. NO S/S OF ACUTE DISTRESS NOTED. 
PT DENIES ANY PAIN AT THIS TIME. RIGHT HAND 22G INTACT, NO SIGNS OF INFILTRATION NOTED. BED 
LOCKED IN LOWEST POSITION. SIDE RAILS UP. CLOSE TO NURSING STATION. SAFETY AND FALL 
PRECAUTIONS ARE IN PLACE. ALL NEEDS ARE MET THROUGHOUT SHIFT. WILL CONTINUE TO MONITOR UNTIL 
ENDORSE TO DAY SHIFT RN. .

## 2020-08-30 NOTE — NUR
RN ROUNDS

PT SLEEPING. BREATHING EVEN AND UNLABORED TO ROOM AIR. NO S/S OF ACUTE DISTRESS NOTED. BED 
LOCKED IN LOWEST POSITION. CALL LIGHT WITHIN REACH. SIDE RAILS UP. SAFETY AND FALL 
PRECAUTIONS ARE IN PLACE. WILL CONTINUE TO MONITOR.

## 2020-09-01 ENCOUNTER — HOSPITAL ENCOUNTER (INPATIENT)
Dept: HOSPITAL 4 - SED | Age: 75
LOS: 3 days | Discharge: HOME | DRG: 682 | End: 2020-09-04
Attending: INTERNAL MEDICINE | Admitting: INTERNAL MEDICINE
Payer: SELF-PAY

## 2020-09-01 VITALS — WEIGHT: 128 LBS | BODY MASS INDEX: 23.55 KG/M2 | HEIGHT: 62 IN

## 2020-09-01 VITALS — SYSTOLIC BLOOD PRESSURE: 140 MMHG

## 2020-09-01 VITALS — SYSTOLIC BLOOD PRESSURE: 119 MMHG

## 2020-09-01 DIAGNOSIS — I42.0: ICD-10-CM

## 2020-09-01 DIAGNOSIS — I50.9: ICD-10-CM

## 2020-09-01 DIAGNOSIS — I95.89: ICD-10-CM

## 2020-09-01 DIAGNOSIS — E43: ICD-10-CM

## 2020-09-01 DIAGNOSIS — Z79.899: ICD-10-CM

## 2020-09-01 DIAGNOSIS — E86.0: ICD-10-CM

## 2020-09-01 DIAGNOSIS — R19.7: ICD-10-CM

## 2020-09-01 DIAGNOSIS — N17.0: Primary | ICD-10-CM

## 2020-09-01 LAB
ALBUMIN SERPL BCP-MCNC: 3.3 G/DL (ref 3.4–4.8)
ALT SERPL W P-5'-P-CCNC: 60 U/L (ref 12–78)
ANION GAP SERPL CALCULATED.3IONS-SCNC: 8 MMOL/L (ref 5–15)
AST SERPL W P-5'-P-CCNC: 45 U/L (ref 10–37)
BASOPHILS # BLD AUTO: 0.1 K/UL (ref 0–0.2)
BASOPHILS NFR BLD AUTO: 0.6 % (ref 0–2)
BILIRUB SERPL-MCNC: 0.7 MG/DL (ref 0–1)
BUN SERPL-MCNC: 27 MG/DL (ref 8–21)
CALCIUM SERPL-MCNC: 9.3 MG/DL (ref 8.4–11)
CHLORIDE SERPL-SCNC: 100 MMOL/L (ref 98–107)
CREAT SERPL-MCNC: 1.38 MG/DL (ref 0.55–1.3)
EOSINOPHIL # BLD AUTO: 0.1 K/UL (ref 0–0.4)
EOSINOPHIL NFR BLD AUTO: 1.5 % (ref 0–4)
ERYTHROCYTE [DISTWIDTH] IN BLOOD BY AUTOMATED COUNT: 13.3 % (ref 9–15)
GFR SERPL CREATININE-BSD FRML MDRD: (no result) ML/MIN (ref 90–?)
GLUCOSE SERPL-MCNC: 133 MG/DL (ref 70–99)
HCT VFR BLD AUTO: 39.4 % (ref 36–48)
HGB BLD-MCNC: 13.1 G/DL (ref 12–16)
INR PPP: 1 (ref 0.8–1.2)
LYMPHOCYTES # BLD AUTO: 1.9 K/UL (ref 1–5.5)
LYMPHOCYTES NFR BLD AUTO: 20.1 % (ref 20.5–51.5)
MCH RBC QN AUTO: 31 PG (ref 27–31)
MCHC RBC AUTO-ENTMCNC: 33 % (ref 32–36)
MCV RBC AUTO: 92 FL (ref 79–98)
MONOCYTES # BLD MANUAL: 0.7 K/UL (ref 0–1)
MONOCYTES # BLD MANUAL: 7.5 % (ref 1.7–9.3)
NEUTROPHILS # BLD AUTO: 6.6 K/UL (ref 1.8–7.7)
NEUTROPHILS NFR BLD AUTO: 70.3 % (ref 40–70)
PLATELET # BLD AUTO: 285 K/UL (ref 130–430)
POTASSIUM SERPL-SCNC: 4.2 MMOL/L (ref 3.5–5.1)
PROTHROMBIN TIME: 9.9 SECS (ref 9.5–12.5)
RBC # BLD AUTO: 4.26 MIL/UL (ref 4.2–6.2)
SODIUM SERPLBLD-SCNC: 134 MMOL/L (ref 136–145)
WBC # BLD AUTO: 9.4 K/UL (ref 4.8–10.8)

## 2020-09-01 PROCEDURE — G0378 HOSPITAL OBSERVATION PER HR: HCPCS

## 2020-09-01 RX ADMIN — SODIUM CHLORIDE SCH MLS/HR: 9 INJECTION, SOLUTION INTRAVENOUS at 23:34

## 2020-09-02 VITALS — SYSTOLIC BLOOD PRESSURE: 110 MMHG

## 2020-09-02 VITALS — SYSTOLIC BLOOD PRESSURE: 96 MMHG

## 2020-09-02 VITALS — SYSTOLIC BLOOD PRESSURE: 118 MMHG

## 2020-09-02 VITALS — SYSTOLIC BLOOD PRESSURE: 100 MMHG

## 2020-09-02 VITALS — SYSTOLIC BLOOD PRESSURE: 104 MMHG

## 2020-09-02 VITALS — SYSTOLIC BLOOD PRESSURE: 107 MMHG

## 2020-09-02 VITALS — SYSTOLIC BLOOD PRESSURE: 106 MMHG

## 2020-09-02 VITALS — SYSTOLIC BLOOD PRESSURE: 115 MMHG

## 2020-09-02 VITALS — SYSTOLIC BLOOD PRESSURE: 120 MMHG

## 2020-09-02 LAB
ALBUMIN SERPL BCP-MCNC: 2.8 G/DL (ref 3.4–4.8)
ALT SERPL W P-5'-P-CCNC: 55 U/L (ref 12–78)
ANION GAP SERPL CALCULATED.3IONS-SCNC: 7 MMOL/L (ref 5–15)
AST SERPL W P-5'-P-CCNC: 34 U/L (ref 10–37)
BASOPHILS # BLD AUTO: 0 K/UL (ref 0–0.2)
BASOPHILS NFR BLD AUTO: 0.5 % (ref 0–2)
BILIRUB SERPL-MCNC: 0.7 MG/DL (ref 0–1)
BUN SERPL-MCNC: 21 MG/DL (ref 8–21)
CALCIUM SERPL-MCNC: 8.6 MG/DL (ref 8.4–11)
CHLORIDE SERPL-SCNC: 104 MMOL/L (ref 98–107)
CREAT SERPL-MCNC: 1.18 MG/DL (ref 0.55–1.3)
EOSINOPHIL # BLD AUTO: 0.2 K/UL (ref 0–0.4)
EOSINOPHIL NFR BLD AUTO: 2.7 % (ref 0–4)
ERYTHROCYTE [DISTWIDTH] IN BLOOD BY AUTOMATED COUNT: 13.7 % (ref 9–15)
GFR SERPL CREATININE-BSD FRML MDRD: (no result) ML/MIN (ref 90–?)
GLUCOSE SERPL-MCNC: 95 MG/DL (ref 70–99)
HCT VFR BLD AUTO: 36.4 % (ref 36–48)
HGB BLD-MCNC: 12.2 G/DL (ref 12–16)
LYMPHOCYTES # BLD AUTO: 1.7 K/UL (ref 1–5.5)
LYMPHOCYTES NFR BLD AUTO: 29.2 % (ref 20.5–51.5)
MCH RBC QN AUTO: 31 PG (ref 27–31)
MCHC RBC AUTO-ENTMCNC: 34 % (ref 32–36)
MCV RBC AUTO: 91 FL (ref 79–98)
MONOCYTES # BLD MANUAL: 0.6 K/UL (ref 0–1)
MONOCYTES # BLD MANUAL: 10 % (ref 1.7–9.3)
NEUTROPHILS # BLD AUTO: 3.3 K/UL (ref 1.8–7.7)
NEUTROPHILS NFR BLD AUTO: 57.6 % (ref 40–70)
PLATELET # BLD AUTO: 252 K/UL (ref 130–430)
POTASSIUM SERPL-SCNC: 3.9 MMOL/L (ref 3.5–5.1)
RBC # BLD AUTO: 3.98 MIL/UL (ref 4.2–6.2)
SODIUM SERPLBLD-SCNC: 136 MMOL/L (ref 136–145)
WBC # BLD AUTO: 5.7 K/UL (ref 4.8–10.8)

## 2020-09-02 RX ADMIN — SODIUM CHLORIDE SCH MLS/HR: 9 INJECTION, SOLUTION INTRAVENOUS at 12:33

## 2020-09-02 RX ADMIN — CARVEDILOL SCH MG: 6.25 TABLET, FILM COATED ORAL at 20:57

## 2020-09-03 VITALS — SYSTOLIC BLOOD PRESSURE: 129 MMHG

## 2020-09-03 VITALS — SYSTOLIC BLOOD PRESSURE: 128 MMHG

## 2020-09-03 VITALS — SYSTOLIC BLOOD PRESSURE: 108 MMHG

## 2020-09-03 VITALS — SYSTOLIC BLOOD PRESSURE: 97 MMHG

## 2020-09-03 VITALS — SYSTOLIC BLOOD PRESSURE: 101 MMHG

## 2020-09-03 RX ADMIN — SODIUM CHLORIDE SCH MLS/HR: 9 INJECTION, SOLUTION INTRAVENOUS at 03:13

## 2020-09-03 RX ADMIN — CARVEDILOL SCH MG: 6.25 TABLET, FILM COATED ORAL at 21:07

## 2020-09-03 RX ADMIN — FUROSEMIDE SCH MG: 40 TABLET ORAL at 09:24

## 2020-09-03 RX ADMIN — LOSARTAN POTASSIUM SCH MG: 25 TABLET, FILM COATED ORAL at 09:24

## 2020-09-03 RX ADMIN — CARVEDILOL SCH MG: 6.25 TABLET, FILM COATED ORAL at 09:25

## 2020-09-03 RX ADMIN — ENOXAPARIN SODIUM SCH MG: 30 INJECTION SUBCUTANEOUS at 09:26

## 2020-09-04 VITALS — SYSTOLIC BLOOD PRESSURE: 103 MMHG

## 2020-09-04 VITALS — SYSTOLIC BLOOD PRESSURE: 96 MMHG

## 2020-09-04 VITALS — SYSTOLIC BLOOD PRESSURE: 124 MMHG

## 2020-09-04 LAB
ANION GAP SERPL CALCULATED.3IONS-SCNC: 6 MMOL/L (ref 5–15)
BASOPHILS # BLD AUTO: 0 K/UL (ref 0–0.2)
BASOPHILS NFR BLD AUTO: 0.5 % (ref 0–2)
BUN SERPL-MCNC: 14 MG/DL (ref 8–21)
CALCIUM SERPL-MCNC: 8.4 MG/DL (ref 8.4–11)
CHLORIDE SERPL-SCNC: 109 MMOL/L (ref 98–107)
CREAT SERPL-MCNC: 0.99 MG/DL (ref 0.55–1.3)
EOSINOPHIL # BLD AUTO: 0.3 K/UL (ref 0–0.4)
EOSINOPHIL NFR BLD AUTO: 5.3 % (ref 0–4)
ERYTHROCYTE [DISTWIDTH] IN BLOOD BY AUTOMATED COUNT: 13.4 % (ref 9–15)
GFR SERPL CREATININE-BSD FRML MDRD: (no result) ML/MIN (ref 90–?)
GLUCOSE SERPL-MCNC: 87 MG/DL (ref 70–99)
HCT VFR BLD AUTO: 37.8 % (ref 36–48)
HGB BLD-MCNC: 12.7 G/DL (ref 12–16)
LYMPHOCYTES # BLD AUTO: 1.3 K/UL (ref 1–5.5)
LYMPHOCYTES NFR BLD AUTO: 24.6 % (ref 20.5–51.5)
MCH RBC QN AUTO: 31 PG (ref 27–31)
MCHC RBC AUTO-ENTMCNC: 34 % (ref 32–36)
MCV RBC AUTO: 92 FL (ref 79–98)
MONOCYTES # BLD MANUAL: 0.5 K/UL (ref 0–1)
MONOCYTES # BLD MANUAL: 9.1 % (ref 1.7–9.3)
NEUTROPHILS # BLD AUTO: 3.2 K/UL (ref 1.8–7.7)
NEUTROPHILS NFR BLD AUTO: 60.5 % (ref 40–70)
PLATELET # BLD AUTO: 224 K/UL (ref 130–430)
POTASSIUM SERPL-SCNC: 3.6 MMOL/L (ref 3.5–5.1)
RBC # BLD AUTO: 4.11 MIL/UL (ref 4.2–6.2)
SODIUM SERPLBLD-SCNC: 139 MMOL/L (ref 136–145)
WBC # BLD AUTO: 5.3 K/UL (ref 4.8–10.8)

## 2020-09-04 RX ADMIN — SODIUM CHLORIDE SCH MLS/HR: 9 INJECTION, SOLUTION INTRAVENOUS at 05:56

## 2020-09-04 RX ADMIN — FUROSEMIDE SCH MG: 40 TABLET ORAL at 09:01

## 2020-09-04 RX ADMIN — CARVEDILOL SCH MG: 6.25 TABLET, FILM COATED ORAL at 09:00

## 2020-09-04 RX ADMIN — LOSARTAN POTASSIUM SCH MG: 25 TABLET, FILM COATED ORAL at 09:00

## 2020-09-04 RX ADMIN — ENOXAPARIN SODIUM SCH MG: 30 INJECTION SUBCUTANEOUS at 08:56

## 2021-07-01 NOTE — NUR
# 22 gauge angiocath placed to right hand.  Use of asceptic technique.  Opsite 
placed over site.  Blood return noted. Flushed with 10 cc of normal saline.  No 
evidence of infiltration noted.  Patient tolerated well. [Thrill] : thrill [Pulsatile Thrill] : no pulsatile thrill [Aneurysm] : no aneurysm [Bleeding] : no bleeding [Hand well perfused] : hand well perfused [Ulcer] : no ulcer [2+] : left 2+ [Normal] : coordination grossly intact, no focal deficits [de-identified] :  strength 5/5 b/l upper extremities [de-identified] : intact

## 2021-08-26 NOTE — NUR
Addended by: BHAVANA VILLANUEVA on: 8/26/2021 12:45 PM     Modules accepted: Orders     alert, oriented, and appropriate, despite the fact , a very basic command of English 
speaking, able to make her needs known

Seen by both attending,, and cardiologist, patient is discharged to home.

Author called Son, Luis E, [phone], with 5 RX needs to get filled.  Patient herself 
is a visitor from [facility], got stuck secondary to pandemic, she stays with the son.

RX include  1/  aldactone 25mg po daily

2/  potassium 20meq po daily

3/  cozaar 50mg po daily

4/  lasix 60mg po, daily

5/  coreg 12.5mg po bid daily

from 1-4, all given today by the author, EXCEPT Coreg, bid.  Everything explained in details 
to son, for follow up with the patient's meds at home.  Verbalized understanding.

 time is 1500 today

## 2022-08-25 NOTE — NUR
Airway  Performed by: Floyd Caputo MD  Authorized by: Floyd Caputo MD     Final Airway Type:  Endotracheal airway  Final Endotracheal Airway*:  ETT  ETT Size (mm)*:  7.5  Cuff*:  Regular  Technique Used for Successful ETT Placement:  Direct laryngoscopy  Devices/Methods Used in Placement*:  Mask  Intubation Procedure*:  Preoxygenation, ETCO2, Atraumatic, Dentition Unchanged and Pharynx Clear  Insertion Site:  Oral  Blade Type*:  MAC  Blade Size*:  4  Measured from*:  Lips  Secured at (cm)*:  22  Placement Verified by: auscultation and capnometry    Glottic View*:  1 - full view of glottis  Attempts*:  1   Patient Identified, Procedure confirmed, Emergency equipment available and Safety protocols followed  Location:  OR  Urgency:  Elective  Difficult Airway: No    Indications for Airway Management:  Anesthesia  Spontaneous Ventilation: absent    Sedation Level:  Anesthetized  Mask Difficulty Assessment:  1 - vent by mask  Performed By:  Anesthesiologist  Anesthesiologist:  Floyd Caputo MD  Start Time: 8/25/2022 1:46 PM     Dietitian Recommendations 

* Continue current diet order

* Encourage PO intake



Please see nutrition assessment for details.

SS,FAY

## 2024-09-03 NOTE — NUR
INITIAL NOTE



AT INITIAL ASSESSMENT, PATIENT IS RESTING IN BED, STABLE, NO SIGNS OF RESPIRATORY DISTRESS. 
PLAN OF CARE FOR THE EVENING IS COMMUNICATED WITH THE PATIENT. PATIENT VERBALIZES NO PAIN. 
PATIENT IS SUCCESSFUL IN TEACH-BACK OF CALL LIGHT USAGE AT THIS TIME. BED IS LOCKED, 
ALARMED, AND AT THE LOWEST LEVEL. FALL, SAFETY, AND RESPIRATORY PRECAUTIONS WILL BE TAKEN 
THROUGHOUT THE SHIFT. 20 repetitions   Foot resting on wall mini hip hinge x 10 repetitions bilateral - good with cues     Next:  RDL to hip extension with foam roll for balance   Standing hip flexion with foam roll balance - leg in external rotation   Elevated sacrum, reverse march   Planks     Skilled input: verbal instruction/cues and tactile instruction/cues    Writer verbally educated and received verbal consent for hand placement, positioning of patient, and techniques to be performed today from patient for hand placement and palpation for techniques, clothing adjustments for techniques and therapist position for techniques as described above and how they are pertinent to the patient's plan of care.  Home Exercise Program  Access Code: [email or web address]  URL: [email or web address]  Date: 08/27/2024  Prepared by: Kaye Zamarripa    Exercises  - Prone Quadriceps Stretch with Strap  - 1 x daily - 7 x weekly - 10 reps - 30 secibds hold  - Beginner Flat Bicycle  - 1 x daily - 7 x weekly - 3 sets - 10 reps  - Supine 90/90 Abdominal Bracing  - 1 x daily - 7 x weekly - 10 reps - 10 seconds hold  - Sidelying Open Book  - 1 x daily - 7 x weekly - 3 sets - 10 reps  - Supine Lower Trunk Rotation  - 1 x daily - 7 x weekly - 3 sets - 10 reps  - Supine Bridge  - 1 x daily - 7 x weekly - 3 sets - 10 reps  - Standing Lumbar Extension with Counter  - 5 x daily - 7 x weekly - 1 sets - 10 reps  - Sidelying Hip Abduction  - 1 x daily - 7 x weekly - 3 sets - 10 reps      ASSESSMENT                                                                                                            Started the session with home program review with re-education and cuing for proper form. All of these felt good and looked good. Cue was to create space between her vertebrae when doing core exercises and this helped to activate the core more and keep it out of her back. She was progressed in a hip hinging exercise, where could feel her glutes  